# Patient Record
Sex: FEMALE | Race: WHITE
[De-identification: names, ages, dates, MRNs, and addresses within clinical notes are randomized per-mention and may not be internally consistent; named-entity substitution may affect disease eponyms.]

---

## 2017-08-17 ENCOUNTER — HOSPITAL ENCOUNTER (EMERGENCY)
Dept: HOSPITAL 47 - EC | Age: 27
Discharge: HOME | End: 2017-08-17
Payer: COMMERCIAL

## 2017-08-17 VITALS — DIASTOLIC BLOOD PRESSURE: 56 MMHG | SYSTOLIC BLOOD PRESSURE: 109 MMHG | TEMPERATURE: 97.6 F | HEART RATE: 80 BPM

## 2017-08-17 VITALS — RESPIRATION RATE: 18 BRPM

## 2017-08-17 DIAGNOSIS — O99.332: ICD-10-CM

## 2017-08-17 DIAGNOSIS — O99.89: Primary | ICD-10-CM

## 2017-08-17 DIAGNOSIS — R10.30: ICD-10-CM

## 2017-08-17 DIAGNOSIS — Z3A.15: ICD-10-CM

## 2017-08-17 DIAGNOSIS — F17.200: ICD-10-CM

## 2017-08-17 LAB
ALP SERPL-CCNC: 54 U/L (ref 38–126)
ALT SERPL-CCNC: 46 U/L (ref 9–52)
AMYLASE SERPL-CCNC: 49 U/L (ref 30–110)
ANION GAP SERPL CALC-SCNC: 8 MMOL/L
AST SERPL-CCNC: 23 U/L (ref 14–36)
BASOPHILS # BLD AUTO: 0 K/UL (ref 0–0.2)
BASOPHILS NFR BLD AUTO: 0 %
BUN SERPL-SCNC: 7 MG/DL (ref 7–17)
CALCIUM SPEC-MCNC: 9 MG/DL (ref 8.4–10.2)
CH: 31.2
CHCM: 35.2
CHLORIDE SERPL-SCNC: 107 MMOL/L (ref 98–107)
CO2 SERPL-SCNC: 21 MMOL/L (ref 22–30)
EOSINOPHIL # BLD AUTO: 0.1 K/UL (ref 0–0.7)
EOSINOPHIL NFR BLD AUTO: 0 %
ERYTHROCYTE [DISTWIDTH] IN BLOOD BY AUTOMATED COUNT: 4.2 M/UL (ref 3.8–5.4)
ERYTHROCYTE [DISTWIDTH] IN BLOOD: 13.3 % (ref 11.5–15.5)
GLUCOSE SERPL-MCNC: 85 MG/DL (ref 74–99)
HCT VFR BLD AUTO: 37.4 % (ref 34–46)
HDW: 2.35
HGB BLD-MCNC: 12.9 GM/DL (ref 11.4–16)
LUC NFR BLD AUTO: 1 %
LYMPHOCYTES # SPEC AUTO: 2.5 K/UL (ref 1–4.8)
LYMPHOCYTES NFR SPEC AUTO: 17 %
MCH RBC QN AUTO: 30.6 PG (ref 25–35)
MCHC RBC AUTO-ENTMCNC: 34.4 G/DL (ref 31–37)
MCV RBC AUTO: 89.1 FL (ref 80–100)
MONOCYTES # BLD AUTO: 0.5 K/UL (ref 0–1)
MONOCYTES NFR BLD AUTO: 3 %
NEUTROPHILS # BLD AUTO: 11.9 K/UL (ref 1.3–7.7)
NEUTROPHILS NFR BLD AUTO: 78 %
NON-AFRICAN AMERICAN GFR(MDRD): >60
PARTICLE COUNT: (no result)
PH UR: 5.5 [PH] (ref 5–8)
POTASSIUM SERPL-SCNC: 3.7 MMOL/L (ref 3.5–5.1)
PROT SERPL-MCNC: 6.7 G/DL (ref 6.3–8.2)
RBC UR QL: 1 /HPF (ref 0–5)
SODIUM SERPL-SCNC: 136 MMOL/L (ref 137–145)
SP GR UR: 1.01 (ref 1–1.03)
SQUAMOUS UR QL AUTO: 8 /HPF (ref 0–4)
UA BILLING (MACRO VS. MICRO): (no result)
UROBILINOGEN UR QL STRIP: <2 MG/DL (ref ?–2)
WBC # BLD AUTO: 0.17 10*3/UL
WBC # BLD AUTO: 15.2 K/UL (ref 3.8–10.6)
WBC #/AREA URNS HPF: 2 /HPF (ref 0–5)
WBC (PEROX): 15.37

## 2017-08-17 PROCEDURE — 81001 URINALYSIS AUTO W/SCOPE: CPT

## 2017-08-17 PROCEDURE — 85025 COMPLETE CBC W/AUTO DIFF WBC: CPT

## 2017-08-17 PROCEDURE — 96360 HYDRATION IV INFUSION INIT: CPT

## 2017-08-17 PROCEDURE — 80053 COMPREHEN METABOLIC PANEL: CPT

## 2017-08-17 PROCEDURE — 83690 ASSAY OF LIPASE: CPT

## 2017-08-17 PROCEDURE — 76805 OB US >/= 14 WKS SNGL FETUS: CPT

## 2017-08-17 PROCEDURE — 99284 EMERGENCY DEPT VISIT MOD MDM: CPT

## 2017-08-17 PROCEDURE — 36415 COLL VENOUS BLD VENIPUNCTURE: CPT

## 2017-08-17 PROCEDURE — 82150 ASSAY OF AMYLASE: CPT

## 2017-08-17 NOTE — ED
Abdominal Pain HPI





- General


Chief Complaint: Abdominal Pain


Stated Complaint: 14 weeks pregnant/ abd Pain


Time Seen by Provider: 08/17/17 20:21


Source: patient, RN notes reviewed


Mode of arrival: ambulatory


Limitations: no limitations





- History of Present Illness


Initial Comments: 





26-year-old female presents emergency Department chief complaint abdominal pain 

and pregnancy.  Patient states it lasted for days she's had some lower 

abdominal pain.  Denies any vaginal bleeding or vaginal discharge.  Patient 

states that she has had an ultrasound has had lab work performed by her OB/GYN 

though she's not had an official appointment.  Patient did have IUP on 

ultrasound.  Patient denies any fever, chills, nausea, vomiting diarrhea 

constipation.  Patient denies any dysuria or hematuria.  She states that pain 

is usually worse when she is at work and goes away when she rests.





- Related Data


 Home Medications











 Medication  Instructions  Recorded  Confirmed


 


No Known Home Medications [No  09/24/16 09/24/16





Known Home Medications]   











 Allergies











Allergy/AdvReac Type Severity Reaction Status Date / Time


 


No Known Allergies Allergy   Verified 08/17/17 20:16














Review of Systems


ROS Statement: 


Those systems with pertinent positive or pertinent negative responses have been 

documented in the HPI.





ROS Other: All systems not noted in ROS Statement are negative.





Past Medical History


Past Medical History: No Reported History


History of Any Multi-Drug Resistant Organisms: None Reported


Past Surgical History: Adenoidectomy, Tonsillectomy


Past Psychological History: Anxiety


Smoking Status: Current every day smoker


Past Alcohol Use History: Heavy


Past Drug Use History: Marijuana, Prescription Drug Abuse





General Exam


Limitations: no limitations


General appearance: alert, in no apparent distress


Neck exam: Present: normal inspection.  Absent: tenderness, meningismus, 

lymphadenopathy


Respiratory exam: Present: normal lung sounds bilaterally.  Absent: respiratory 

distress, wheezes, rales, rhonchi, stridor


Cardiovascular Exam: Present: regular rate, normal rhythm, normal heart sounds.

  Absent: systolic murmur, diastolic murmur, rubs, gallop, clicks


GI/Abdominal exam: Present: soft, tenderness (mild tenderness along the lower 

abdomen), normal bowel sounds.  Absent: distended, guarding, rebound, rigid


Back exam: Absent: CVA tenderness (R), CVA tenderness (L)





Course


 Vital Signs











  08/17/17





  20:12


 


Temperature 99.4 F


 


Pulse Rate 91


 


Respiratory 18





Rate 


 


Blood Pressure 131/70


 


O2 Sat by Pulse 99





Oximetry 














Medical Decision Making





- Medical Decision Making





26 show female presented for lower abdominal pain pregnancy.  Patient ultrasound

, lab work essentially unremarkable.  Patient's ultrasound does not reveal any 

acute comp in process.  Patient's pain may related to round ligament pain 

issues and second trimester 15 weeks pregnant.  Patient's pain improved with 

rest.  Patient does not want to pelvic exam will follow-up with Dr. Blackwood her OB

/GYN





- Lab Data


Result diagrams: 


 08/17/17 20:25





 08/17/17 20:25


 Lab Results











  08/17/17 08/17/17 08/17/17 Range/Units





  20:25 20:25 20:25 


 


WBC    15.2 H  (3.8-10.6)  k/uL


 


RBC    4.20  (3.80-5.40)  m/uL


 


Hgb    12.9  (11.4-16.0)  gm/dL


 


Hct    37.4  (34.0-46.0)  %


 


MCV    89.1  (80.0-100.0)  fL


 


MCH    30.6  (25.0-35.0)  pg


 


MCHC    34.4  (31.0-37.0)  g/dL


 


RDW    13.3  (11.5-15.5)  %


 


Plt Count    331  (150-450)  k/uL


 


Neutrophils %    78  %


 


Lymphocytes %    17  %


 


Monocytes %    3  %


 


Eosinophils %    0  %


 


Basophils %    0  %


 


Neutrophils #    11.9 H  (1.3-7.7)  k/uL


 


Lymphocytes #    2.5  (1.0-4.8)  k/uL


 


Monocytes #    0.5  (0-1.0)  k/uL


 


Eosinophils #    0.1  (0-0.7)  k/uL


 


Basophils #    0.0  (0-0.2)  k/uL


 


Sodium   136 L   (137-145)  mmol/L


 


Potassium   3.7   (3.5-5.1)  mmol/L


 


Chloride   107   ()  mmol/L


 


Carbon Dioxide   21 L   (22-30)  mmol/L


 


Anion Gap   8   mmol/L


 


BUN   7   (7-17)  mg/dL


 


Creatinine   0.47 L   (0.52-1.04)  mg/dL


 


Est GFR (MDRD) Af Amer   >60   (>60 ml/min/1.73 sqM)  


 


Est GFR (MDRD) Non-Af   >60   (>60 ml/min/1.73 sqM)  


 


Glucose   85   (74-99)  mg/dL


 


Calcium   9.0   (8.4-10.2)  mg/dL


 


Total Bilirubin   0.2   (0.2-1.3)  mg/dL


 


AST   23   (14-36)  U/L


 


ALT   46   (9-52)  U/L


 


Alkaline Phosphatase   54   ()  U/L


 


Total Protein   6.7   (6.3-8.2)  g/dL


 


Albumin   3.8   (3.5-5.0)  g/dL


 


Amylase   49   ()  U/L


 


Lipase   188   ()  U/L


 


Urine Color  Yellow    


 


Urine Appearance  Cloudy H    (Clear)  


 


Urine pH  5.5    (5.0-8.0)  


 


Ur Specific Gravity  1.015    (1.001-1.035)  


 


Urine Protein  Negative    (Negative)  


 


Urine Glucose (UA)  Negative    (Negative)  


 


Urine Ketones  Negative    (Negative)  


 


Urine Blood  Negative    (Negative)  


 


Urine Nitrite  Negative    (Negative)  


 


Urine Bilirubin  Negative    (Negative)  


 


Urine Urobilinogen  <2.0    (<2.0)  mg/dL


 


Ur Leukocyte Esterase  Negative    (Negative)  


 


Urine RBC  1    (0-5)  /hpf


 


Urine WBC  2    (0-5)  /hpf


 


Ur Squamous Epith Cells  8 H    (0-4)  /hpf


 


Urine Bacteria  Rare H    (None)  /hpf


 


Urine Mucus  Rare H    (None)  /hpf














Disposition


Clinical Impression: 


 Abdominal pain during pregnancy





Disposition: HOME SELF-CARE


Condition: Stable


Instructions:  Abdominal Pain in Pregnancy  (ED)


Additional Instructions: 


Please return to the Emergency Department if symptoms worsen or any other 

concerns.


Referrals: 


None,Stated [Primary Care Provider] - 1-2 days


Cyn Blackwood DO [Doctor of Osteopathic Medicine] - 1-2 days


Time of Disposition: 21:39

## 2018-02-09 ENCOUNTER — HOSPITAL ENCOUNTER (OUTPATIENT)
Dept: HOSPITAL 47 - FBPOP | Age: 28
LOS: 1 days | Discharge: HOME | End: 2018-02-10
Payer: COMMERCIAL

## 2018-02-09 DIAGNOSIS — O47.9: Primary | ICD-10-CM

## 2018-02-09 DIAGNOSIS — Z3A.39: ICD-10-CM

## 2018-02-09 PROCEDURE — 59025 FETAL NON-STRESS TEST: CPT

## 2018-02-09 PROCEDURE — 99213 OFFICE O/P EST LOW 20 MIN: CPT

## 2018-02-10 VITALS
TEMPERATURE: 97.4 F | RESPIRATION RATE: 16 BRPM | HEART RATE: 96 BPM | SYSTOLIC BLOOD PRESSURE: 117 MMHG | DIASTOLIC BLOOD PRESSURE: 73 MMHG

## 2018-02-10 NOTE — P.MSEPDOC
Presenting Problems





- Arrival Data


Date of Arrival on Unit: 18


Time of Arrival on Unit: 22:25


Mode of Transport: Ambulatory





- Complaint


OB-Reason for Admission/Chief Complaint: Possible Onset of Labor


Comment: Loss of mucous plug





Prenatal Medical History





- Pregnancy Information


: 3


Para: 0


Term: 0


: 0


Abortions: Spontaneous or Elective: 0


Number of Living Children: 0





- Gestational Age


Gestational Age by WILSON (wks/days): 39 Weeks and 5 Days





- Prenatal History


Pregnancy Complications: Hx. Substance Abuse


Comment: marijuana use





Review of Systems





- Review of Systems


Constitutional: No problems


Breast: No problems


ENT: No problems


Cardiovascular: No problems


Respiratory: No problems


Gastrointestinal: No problems


Genitourinary: No problems


Musculoskeletal: No problems


Neurological: No problems


Skin: No problems





Vital Signs





- Temperature


Temperature: 97.4 F


Temperature Source: Temporal Artery Scan





- Pulse


  ** Right Sitting Brachial


Pulse Rate: 96


Pulse Assessment Method: Automatic Cuff





- Respirations


Respiratory Rate: 16


Oxygen Delivery Method: Room Air


O2 Sat by Pulse Oximetry: 99





- Blood Pressure


  ** Right Arm Supine


Blood Pressure: 117/73


Blood Pressure Mean: 87


Blood Pressure Source: Automatic Cuff





Medical Screen Scoring (Pre)





- Cervical Exam


Dilation: 1-3 cm = 1


Effacement: More than 50% = 2


Membranes: Intact





- Uterine Contractions


Frequency: > 5 minutes apart = 1


Duration: N/A


Intensity: N/A





- Maternal Vital Signs


Maternal Temperature: N/A


Maternal Blood Pressure: N/A


Signs of Preeclampsia: N/A





- Pain Assessment


Pain Location and Character: Back


Pain Scale Used: Numeric (1 - 10)


Pain Intensity: 4


Pain Management Goal: 4


Pain Description: *Acute, Cramping


Pain Radiation Location: back


Pain Frequency: Intermittent


Pain Duration: 3


Pain Duration Units: Hours


Pain Behavior: Fidgeting


Effects of Pain: slow moving


Pain Aggravating Factors: Contractions





- Maternal Trauma


Maternal Trauma: N/A





- Fetal Assessment


Baseline FHR: 135


Fetal Heart Rate - NICHD Category: Category I (Normal) = 0


NST: Reactive


Fetal Position: N/A


Fetal Station: N/A





- Total Score


Total Score (Pre): 4





- Level of Risk


Level of Risk: Low (0-5)





Physician Notification (Pre)





- Physician Notified


Physician Notified Date: 02/10/18


Physician Notified Time: 00:00


Physician/Practitioner Notifed:: Jacoby


Spoke With: Jacoby


New Order Received: Yes





- Notification Comment


Comment: May discharge to home with instructions





Disposition





- Disposition


OB Disposition: Discharge to home


Transferred to:: Home


Discharge Date: 02/10/18


Discharge Time: 00:05


I agree with the RN Medical Screening Exam: Yes


Risk & Benefit of care provided described in d/c instruction: Yes


Diagnosis: FALSE LABOR, UNSPECIFIED

## 2018-02-19 ENCOUNTER — HOSPITAL ENCOUNTER (INPATIENT)
Dept: HOSPITAL 47 - FBPOP | Age: 28
LOS: 2 days | Discharge: HOME | End: 2018-02-21
Payer: COMMERCIAL

## 2018-02-19 VITALS — BODY MASS INDEX: 42 KG/M2

## 2018-02-19 DIAGNOSIS — O48.0: Primary | ICD-10-CM

## 2018-02-19 DIAGNOSIS — Z3A.41: ICD-10-CM

## 2018-02-19 DIAGNOSIS — F17.200: ICD-10-CM

## 2018-02-19 LAB
BASOPHILS # BLD AUTO: 0.1 K/UL (ref 0–0.2)
BASOPHILS NFR BLD AUTO: 0 %
EOSINOPHIL # BLD AUTO: 0.2 K/UL (ref 0–0.7)
EOSINOPHIL NFR BLD AUTO: 1 %
ERYTHROCYTE [DISTWIDTH] IN BLOOD BY AUTOMATED COUNT: 4.49 M/UL (ref 3.8–5.4)
ERYTHROCYTE [DISTWIDTH] IN BLOOD: 13.3 % (ref 11.5–15.5)
HCT VFR BLD AUTO: 39.6 % (ref 34–46)
HGB BLD-MCNC: 13.1 GM/DL (ref 11.4–16)
LYMPHOCYTES # SPEC AUTO: 2.2 K/UL (ref 1–4.8)
LYMPHOCYTES NFR SPEC AUTO: 13 %
MCH RBC QN AUTO: 29.2 PG (ref 25–35)
MCHC RBC AUTO-ENTMCNC: 33.1 G/DL (ref 31–37)
MCV RBC AUTO: 88.2 FL (ref 80–100)
MONOCYTES # BLD AUTO: 0.6 K/UL (ref 0–1)
MONOCYTES NFR BLD AUTO: 4 %
NEUTROPHILS # BLD AUTO: 13.4 K/UL (ref 1.3–7.7)
NEUTROPHILS NFR BLD AUTO: 80 %
PLATELET # BLD AUTO: 368 K/UL (ref 150–450)
WBC # BLD AUTO: 16.7 K/UL (ref 3.8–10.6)

## 2018-02-19 PROCEDURE — 0KQM0ZZ REPAIR PERINEUM MUSCLE, OPEN APPROACH: ICD-10-PCS

## 2018-02-19 PROCEDURE — 3E0R3NZ INTRODUCTION OF ANALGESICS, HYPNOTICS, SEDATIVES INTO SPINAL CANAL, PERCUTANEOUS APPROACH: ICD-10-PCS

## 2018-02-19 PROCEDURE — 10907ZC DRAINAGE OF AMNIOTIC FLUID, THERAPEUTIC FROM PRODUCTS OF CONCEPTION, VIA NATURAL OR ARTIFICIAL OPENING: ICD-10-PCS

## 2018-02-19 PROCEDURE — 88307 TISSUE EXAM BY PATHOLOGIST: CPT

## 2018-02-19 PROCEDURE — 99213 OFFICE O/P EST LOW 20 MIN: CPT

## 2018-02-19 PROCEDURE — 85025 COMPLETE CBC W/AUTO DIFF WBC: CPT

## 2018-02-19 PROCEDURE — 80306 DRUG TEST PRSMV INSTRMNT: CPT

## 2018-02-19 PROCEDURE — 00HU33Z INSERTION OF INFUSION DEVICE INTO SPINAL CANAL, PERCUTANEOUS APPROACH: ICD-10-PCS

## 2018-02-19 PROCEDURE — 59025 FETAL NON-STRESS TEST: CPT

## 2018-02-19 RX ADMIN — IBUPROFEN PRN MG: 600 TABLET ORAL at 18:04

## 2018-02-19 RX ADMIN — DOCUSATE SODIUM AND SENNOSIDES SCH EACH: 50; 8.6 TABLET ORAL at 19:29

## 2018-02-19 RX ADMIN — POTASSIUM CHLORIDE SCH: 14.9 INJECTION, SOLUTION INTRAVENOUS at 22:20

## 2018-02-19 RX ADMIN — POTASSIUM CHLORIDE SCH MLS/HR: 14.9 INJECTION, SOLUTION INTRAVENOUS at 06:29

## 2018-02-19 RX ADMIN — POTASSIUM CHLORIDE SCH MLS/HR: 14.9 INJECTION, SOLUTION INTRAVENOUS at 05:30

## 2018-02-19 NOTE — P.PROBDLV
Vaginal Delivery Note





- .


Vaginal Delivery Note: 





This is a 27-year-old white female  1 para 0 EDC 2018 at 41 weeks 

gestation.  Patient presented with regular uterine contractions from home, in 

early labor.  Pregnancy was remarkable for group B strep cultures negative, 

blood type A positive, rubella status immune.  THC was noted early in pregnancy 

and urine drug screen.  Please see dictated history and physical for details.





Artificial amniorrhexis revealed very darkly meconium stained urine.  Internal 

scalp lead was applied.  Fetal heart tones were reassuring throughout the first 

and second stages of labor.  Oxytocin was started and titrated per hospital 

protocol.  Patient became uncomfortable and requested epidural, this was placed 

without difficulty per the anesthesia staff.





She became completely dilated at 1340 hrs.  She began the second stage of labor 

at that time.  Pushing was adequate, patient pushed for almost 3 hours prior to 

.  The perineal body was prepped and draped in usual sterile fashion.  

Perineal tissues were quite edematous, and a fair amount of fetal Was noted.  

Infant's head delivered occiput anterior and restituted accordingly.  There was 

a tight nuchal cord 1 that was reduced.  The oropharynx, nasopharynx, and 

external nares were all bulb suctioned on the perineal body.  Patient was 

officially delivered of a liveborn female infant at 1621 hrs.  Umbilical cord 

was doubly clamped and ligated, she was handed to waiting nurses for evaluation 

where Apgar scores of 7 and 9 at one and 5 minutes respectively are given.  The 

placenta delivered spontaneously, it was inspected and noted to be very deeply 

meconium-stained with trivascular cord at 1623 hrs.  At this time the uterus 

was massaged.  Inspection of the cervix, vagina, perineum, periurethral, 

perirectal areas revealed a small second-degree perineal laceration.  This was 

repaired in the usual fashion using 3-0 Vicryl suture.  Rectal examination was 

performed and rectal mucosa was noted to be intact and smooth.  Total estimated 

blood loss 350 mL's.  Infant's birth weight 7 lbs. 7 oz. or 3380 g.  The 

patient and her family are allowed to begin the bonding experience in the LDR.

## 2018-02-19 NOTE — P.HPOB
History of Present Illness


H&P Date: 18





This is a 27-year-old white female  1 para 0 EDC to 1218 at 41 weeks 

gestation.  Patient presents this morning with strong regular uterine 

contractions, fetus is been active throughout the pregnancy.  She denies fluid 

leakage or vaginal bleeding.





Obstetric history significant for blood type A+, rubella status immune.  Urine 

culture, hepatitis B surface antigen, HIV testing, VDRL testing, gonorrhea and 

chlamydia cultures, group B strep cultures all negative.  One-hour Glucola 124.

  Urine drug screen positive for THC.





Past medical history is essentially unremarkable.





Past surgical history tonsillectomy and adenoidectomy .





Current medications prenatal vitamins.





ALLERGIES none known.





Social history patient smokes tobacco, currently less than 1 pack per day.  She 

is single, father of the baby is involved.





On exam this is a pleasant female, 5 foot 2 inches, 230 pounds, blood pressure 

132/87 on admission, otherwise vital signs stable.  The general physical exam 

is within normal limits.  The cervix is 3-4 cm dilated, 80% effaced, -1 station

, vertex presentation.  Artificial amniorrhexis reveals very dark meconium-

stained fluid.  Fetal heart tones are in the 140s with good overall 

variability.  Extremities reveal no edema.





Impression: 41 week intrauterine pregnancy, dark meconium-stained fluid, early 

active labor.





Plan: Oxytocin augmentation per hospital protocol.  Close maternal and fetal 

surveillance.  Epidural has been placed per her request.  Anticipate normal 

spontaneous vaginal delivery.





Review of Systems





Negative except as in HPI





Past Medical History


Past Medical History: No Reported History


History of Any Multi-Drug Resistant Organisms: None Reported


Past Surgical History: Adenoidectomy, Tonsillectomy


Past Anesthesia/Blood Transfusion Reactions: No Reported Reaction


Past Psychological History: Anxiety


Smoking Status: Current every day smoker


Past Alcohol Use History: Heavy


Past Drug Use History: Marijuana, Prescription Drug Abuse





- Past Family History


  ** Mother


Family Medical History: No Reported History





Medications and Allergies


 Home Medications











 Medication  Instructions  Recorded  Confirmed  Type


 


Pnv 11/Iron Fum/Folic Acid/Om3 1 each PO DAILY 18 History





[Virt-Srikanth Dha Softgel]    











 Allergies











Allergy/AdvReac Type Severity Reaction Status Date / Time


 


No Known Allergies Allergy   Verified 18 05:09














Exam





- Vital Signs


Vital signs: 


 Vital Signs











  Temp Pulse Resp BP Pulse Ox


 


 18 05:15  96.7 F L  88  16  132/87  99


 


 18 05:09  96.7 F L  88  16  132/87  99








 Intake and Output











 18





 22:59 06:59 14:59


 


Intake Total  1000 


 


Balance  1000 


 


Intake:   


 


  IV  1000 


 


    Lactated Ringers 1,000 ml  1000 





    @ 125 mls/hr IV .Q8H ERASMO   





    Rx#:[id number]   


 


Other:   


 


  Weight  104.326 kg 














See dictation please





Results


Result Diagrams: 


 18 05:36





 Abnormal Lab Results - Last 24 Hours (Table)











  18 Range/Units





  05:36 


 


WBC  16.7 H  (3.8-10.6)  k/uL


 


Neutrophils #  13.4 H  (1.3-7.7)  k/uL














Assessment and Plan


Plan: 





Oxytocin augmentation per hospital protocol.  Continue close maternal and fetal 

surveillance.  Anticipate normal spontaneous vaginal delivery.


Time with Patient: Less than 30

## 2018-02-20 RX ADMIN — IBUPROFEN PRN MG: 600 TABLET ORAL at 17:48

## 2018-02-20 RX ADMIN — DOCUSATE SODIUM AND SENNOSIDES SCH EACH: 50; 8.6 TABLET ORAL at 07:55

## 2018-02-20 RX ADMIN — IBUPROFEN PRN MG: 600 TABLET ORAL at 07:58

## 2018-02-20 NOTE — P.DS
Providers


Date of admission: 


18 05:13





Expected date of discharge: 18


Attending physician: 


Cyn Blackwood





Primary care physician: 


Stated None





Hospital Course: 





This is a 27-year-old  1 para 0 at 41-0/7 weeks that presented to labor 

and delivery in active labor.  Patient progressed through labor and had a 

normal spontaneous vaginal delivery of a viable female infant.  


Her postpartum course has been uneventful.  She is ambulating and voiding 

without difficulty.  She is breast-feeding without issue.  She is tolerating 

regular diet without nausea or vomiting.  She states her lochia is moderate at 

this time.  Mood is noted to be good today


Patient Condition at Discharge: Good





Plan - Discharge Summary


Discharge Rx Participant: No


New Discharge Prescriptions: 


No Action


   Pnv 11/Iron Fum/Folic Acid/Om3 [Virt-Srikanth Dha Softgel] 1 each PO DAILY


Discharge Medication List





Pnv 11/Iron Fum/Folic Acid/Om3 [Virt-Srikanth Dha Softgel] 1 each PO DAILY 18 

[History]








Follow up Appointment(s)/Referral(s): 


Cyn Blackwood DO [Doctor of Osteopathic Medicine] - 1 Week


Patient Instructions/Handouts:  Vaginal Delivery (DC), Breastfeeding Your Baby (

DC)

## 2018-02-21 VITALS
TEMPERATURE: 98 F | DIASTOLIC BLOOD PRESSURE: 79 MMHG | RESPIRATION RATE: 16 BRPM | SYSTOLIC BLOOD PRESSURE: 119 MMHG | HEART RATE: 96 BPM

## 2018-02-21 RX ADMIN — DOCUSATE SODIUM AND SENNOSIDES SCH: 50; 8.6 TABLET ORAL at 05:37

## 2018-02-21 RX ADMIN — IBUPROFEN PRN MG: 600 TABLET ORAL at 08:29

## 2018-02-21 RX ADMIN — DOCUSATE SODIUM AND SENNOSIDES SCH EACH: 50; 8.6 TABLET ORAL at 08:29

## 2018-02-21 NOTE — P.PN
Subjective


Progress Note Date: 02/21/18


Principal diagnosis: 





Postoperative day #2 status post normal spontaneous vaginal delivery





Patient is doing well on postpartum day #2.  Discharge yesterday was held 

secondary to infant issues.  Baby is now doing well and will most likely be 

discharged home today.  Patient states she is involuting and voiding without 

difficulty.  Her pain is well-controlled.  Her lochia is moderate, and she is 

tolerating a regular diet without nausea or vomiting.  She would like to go 

home today.





Objective





- Vital Signs


Vital signs: 


 Vital Signs











Temp  98.3 F   02/21/18 00:00


 


Pulse  98   02/21/18 00:00


 


Resp  18   02/21/18 00:00


 


BP  107/68   02/21/18 00:00


 


Pulse Ox  99   02/21/18 00:00








 Intake & Output











 02/20/18 02/21/18 02/21/18





 18:59 06:59 18:59


 


Intake Total 120  


 


Balance 120  


 


Intake:   


 


  Oral 120  


 


Other:   


 


  Voiding Method Toilet  


 


  # Voids  1 














- Constitutional


General appearance: Present: no acute distress





- Respiratory


Respiratory: bilateral: CTA





- Cardiovascular


Rhythm: regular





- Gastrointestinal


General gastrointestinal: Present: normal bowel sounds





- Psychiatric


Psychiatric: Present: A&O x's 3, appropriate affect





- Labs


CBC & Chem 7: 


 02/19/18 05:36








Assessment and Plan


(1) Term pregnancy delivered


Current Visit: Yes   Status: Acute   Code(s): O80 - ENCOUNTER FOR FULL-TERM 

UNCOMPLICATED DELIVERY   SNOMED Code(s): 68914312


   





(2) Status post normal vaginal delivery


Narrative/Plan: 


Plan discharge home today, discharge instructions are reviewed with this 

patient in detail.  She is to follow-up in the office with myself in 4-6 weeks 

or sooner should the need arise.


Current Visit: Yes   Status: Acute   Code(s): AMT8382 -    SNOMED Code(s): 

082210351


   


Time with Patient: Less than 30

## 2021-09-14 NOTE — US
EXAMINATION TYPE: US OB >= 14 wk fetus

 

DATE OF EXAM: 8/17/2017

 

COMPARISON: None

 

CLINICAL HISTORY: PainCramping x 4 days 

 

TECHNIQUE:  Transabdominal (TA)

 

GESTATIONAL AGE / DATING

Physician Established: (14 weeks/3 days)

** EDC:  02/12/2018

Dates by LMP:  (14 weeks/3 days)

** EDC: 02/12/2018

Dates by First Scan: This is first scan)

Dates by Current Scan: 15weeks 1day

**EDC: 02/08/2018

FETAL SURVEY

IUP:  Single

PLACENTA: Anterior     

PREVIA:  No Previa

** DEDE: WNL 

CERVICAL LENGTH (transabdominal: norm > 3.0cm): 3.1 cm

(Supplemental transvaginal imaging performed to verify cervical length.)

 

FETAL BIOMETRY

PRESENTATION:  Variable

FETAL LIE:  Transverse with head maternal right

BPD: 2.8 cm

**  15 weeks / 0 days

HC: 10.34 cm

**  14 weeks / 6 days

AC: 8.02 cm

**  14 weeks / 3 days

FL: 1.57 cm

**  14 weeks / 4 days

ESTIMATED FETAL WEIGHT IN GRAMS:  100.82 grams

ESTIMATED FETAL WEIGHT IN LBS/OZS: lbs. 4 oz. 

WEIGHT PERCENTAGE BASED ON ESTABLISHED DATES:   44.4%

HC/AC: 1.29 Normal

FL/AC: 19.54 Normal 

HEART RATE:  144` bpm 

RHYTHM:  Normal

 

 

 

 

 

 

IMPRESSION:

The ultrasound gestational age is 15 weeks. I see no complicating process.
Patient/Caregiver requests family/friend to interpret.

## 2021-12-06 ENCOUNTER — HOSPITAL ENCOUNTER (EMERGENCY)
Dept: HOSPITAL 47 - EC | Age: 31
Discharge: HOME | End: 2021-12-06
Payer: COMMERCIAL

## 2021-12-06 VITALS
TEMPERATURE: 98 F | RESPIRATION RATE: 20 BRPM | SYSTOLIC BLOOD PRESSURE: 109 MMHG | HEART RATE: 84 BPM | DIASTOLIC BLOOD PRESSURE: 76 MMHG

## 2021-12-06 DIAGNOSIS — F41.9: ICD-10-CM

## 2021-12-06 DIAGNOSIS — F12.10: ICD-10-CM

## 2021-12-06 DIAGNOSIS — F17.200: ICD-10-CM

## 2021-12-06 DIAGNOSIS — U07.1: Primary | ICD-10-CM

## 2021-12-06 DIAGNOSIS — Z72.89: ICD-10-CM

## 2021-12-06 PROCEDURE — 99283 EMERGENCY DEPT VISIT LOW MDM: CPT

## 2021-12-06 PROCEDURE — 87635 SARS-COV-2 COVID-19 AMP PRB: CPT

## 2021-12-06 NOTE — ED
General Adult HPI





- General


Chief complaint: Recheck/Abnormal Lab/Rx


Stated complaint: no smell/taste/wants antibodies


Time Seen by Provider: 12/06/21 15:59


Source: patient, RN notes reviewed


Mode of arrival: ambulatory


Limitations: no limitations





- History of Present Illness


Initial comments: 





31-year-old female presents to the emergency room for antibody infusion.  

Patient states her daughter developed symptoms of COVID-19 on Friday and tested 

positive.  Patient states she started to develop symptoms yesterday and last 

night lost her sense of taste and smell.  States that she is congested.  Denies 

any significant cough.  Denies shortness of breath.  States she actually feels 

pretty good but was told she should get antibody infusion.  She is unv

accinated.Patient has no other complaints at this time including shortness of 

breath, chest pain, abdominal pain, nausea or vomiting, headache, or visual 

changes.





- Related Data


                                Home Medications











 Medication  Instructions  Recorded  Confirmed


 


Pnv 11/Iron Fum/Folic Acid/Om3 1 each PO DAILY 02/09/18 02/19/18





[Virt-Srikanth Dha Softgel]   











                                    Allergies











Allergy/AdvReac Type Severity Reaction Status Date / Time


 


No Known Allergies Allergy   Verified 12/06/21 15:47














Review of Systems


ROS Statement: 


Those systems with pertinent positive or pertinent negative responses have been 

documented in the HPI.





ROS Other: All systems not noted in ROS Statement are negative.





Past Medical History


Past Medical History: No Reported History


History of Any Multi-Drug Resistant Organisms: None Reported


Past Surgical History: Adenoidectomy, Tonsillectomy


Past Anesthesia/Blood Transfusion Reactions: No Reported Reaction


Past Psychological History: Anxiety


Smoking Status: Current every day smoker


Past Alcohol Use History: Heavy


Past Drug Use History: Marijuana, Prescription Drug Abuse





- Past Family History


  ** Mother


Family Medical History: No Reported History





General Exam


Limitations: no limitations


General appearance: alert, in no apparent distress


Head exam: Present: atraumatic


Eye exam: Present: normal appearance, PERRL, EOMI.  Absent: scleral icterus, 

conjunctival injection


ENT exam: Present: normal exam, mucous membranes moist


Neck exam: Present: normal inspection, full ROM.  Absent: tenderness


Respiratory exam: Present: normal lung sounds bilaterally.  Absent: respiratory 

distress, wheezes


Cardiovascular Exam: Present: regular rate, normal rhythm, normal heart sounds


GI/Abdominal exam: Present: soft, normal bowel sounds.  Absent: distended, 

tenderness


Neurological exam: Present: alert





Course





                                   Vital Signs











  12/06/21





  15:47


 


Temperature 98.0 F


 


Pulse Rate 84


 


Respiratory 20





Rate 


 


Blood Pressure 109/76


 


O2 Sat by Pulse 98





Oximetry 














Medical Decision Making





- Medical Decision Making





Vitals are stable.  Patient is well appearing.  Oxygenating at 98% room air.  

Patient did test positive for COVID-19.  Patient was given antibody infusion.  

She was monitored for an hour after to ensure she did not develop any ALLERGIC 

reactions.  She is currently stable for discharge home.





- Lab Data





                                   Lab Results











  12/06/21 Range/Units





  15:53 


 


Coronavirus (PCR)  Detected A  (Not Detectd)  














Disposition


Clinical Impression: 


 COVID-19





Disposition: HOME SELF-CARE


Condition: Good


Instructions (If sedation given, give patient instructions):  Coronavirus 

Disease 2019 (COVID-19)


Additional Instructions: 


Please follow up with your doctor in 1-2 days. Return to the ER for any 

worsening symptoms.


Is patient prescribed a controlled substance at d/c from ED?: No


Referrals: 


Lance Quispe MD [Primary Care Provider] - 1-2 days


Time of Disposition: 16:23